# Patient Record
Sex: FEMALE | Race: WHITE | Employment: OTHER | URBAN - METROPOLITAN AREA
[De-identification: names, ages, dates, MRNs, and addresses within clinical notes are randomized per-mention and may not be internally consistent; named-entity substitution may affect disease eponyms.]

---

## 2017-01-11 ENCOUNTER — OFFICE VISIT (OUTPATIENT)
Dept: FAMILY MEDICINE CLINIC | Facility: CLINIC | Age: 42
End: 2017-01-11

## 2017-01-11 VITALS
HEART RATE: 88 BPM | WEIGHT: 173 LBS | RESPIRATION RATE: 12 BRPM | SYSTOLIC BLOOD PRESSURE: 120 MMHG | DIASTOLIC BLOOD PRESSURE: 60 MMHG | BODY MASS INDEX: 29 KG/M2

## 2017-01-11 DIAGNOSIS — S00.83XD CONTUSION OF CHEEK, SUBSEQUENT ENCOUNTER: ICD-10-CM

## 2017-01-11 DIAGNOSIS — S30.0XXD CONTUSION OF BUTTOCK, SUBSEQUENT ENCOUNTER: ICD-10-CM

## 2017-01-11 DIAGNOSIS — S20.212D CONTUSION OF LEFT FRONT WALL OF THORAX, SUBSEQUENT ENCOUNTER: Primary | ICD-10-CM

## 2017-01-11 DIAGNOSIS — S69.91XD INJURY OF RIGHT HAND, SUBSEQUENT ENCOUNTER: ICD-10-CM

## 2017-01-11 DIAGNOSIS — R10.84 ABDOMINAL PAIN, GENERALIZED: ICD-10-CM

## 2017-01-11 PROCEDURE — 99214 OFFICE O/P EST MOD 30 MIN: CPT | Performed by: FAMILY MEDICINE

## 2017-01-11 RX ORDER — ETODOLAC 400 MG/1
400 TABLET, FILM COATED ORAL 2 TIMES DAILY PRN
Qty: 30 TABLET | Refills: 0 | Status: SHIPPED | OUTPATIENT
Start: 2017-01-11 | End: 2017-01-23

## 2017-01-11 RX ORDER — CLONAZEPAM 0.5 MG/1
0.5 TABLET ORAL 2 TIMES DAILY PRN
Qty: 30 TABLET | Refills: 0 | Status: SHIPPED | OUTPATIENT
Start: 2017-01-11 | End: 2017-01-23

## 2017-01-12 NOTE — PROGRESS NOTES
HPI:    Patient ID: Ana Kolb is a 43year old female. HPI  Patient is here for follow-up of multiple contusions over the body and CT scan of the face. No new symptoms. She is feeling better overall. Pain is subsided quite a bit.   Review of motion and sensation in all four extremities.   Neurologic:  alert and oriented to time, space, and person, cranial nerves two through twelve grossly intact, two plus deep tendon reflexes in all four extremities, gait is normal, negative Romberg sign, coord

## 2017-01-23 ENCOUNTER — OFFICE VISIT (OUTPATIENT)
Dept: FAMILY MEDICINE CLINIC | Facility: CLINIC | Age: 42
End: 2017-01-23

## 2017-01-23 ENCOUNTER — MED REC SCAN ONLY (OUTPATIENT)
Dept: FAMILY MEDICINE CLINIC | Facility: CLINIC | Age: 42
End: 2017-01-23

## 2017-01-23 VITALS
HEIGHT: 64.25 IN | BODY MASS INDEX: 29.53 KG/M2 | HEART RATE: 99 BPM | DIASTOLIC BLOOD PRESSURE: 68 MMHG | RESPIRATION RATE: 14 BRPM | WEIGHT: 173 LBS | OXYGEN SATURATION: 99 % | SYSTOLIC BLOOD PRESSURE: 118 MMHG | TEMPERATURE: 98 F

## 2017-01-23 DIAGNOSIS — F41.1 GENERALIZED ANXIETY DISORDER: ICD-10-CM

## 2017-01-23 DIAGNOSIS — S20.212D CONTUSION OF LEFT FRONT WALL OF THORAX, SUBSEQUENT ENCOUNTER: Primary | ICD-10-CM

## 2017-01-23 PROCEDURE — 99214 OFFICE O/P EST MOD 30 MIN: CPT | Performed by: FAMILY MEDICINE

## 2017-01-23 RX ORDER — ETODOLAC 400 MG/1
400 TABLET, FILM COATED ORAL 2 TIMES DAILY PRN
Qty: 30 TABLET | Refills: 0 | Status: SHIPPED | OUTPATIENT
Start: 2017-01-23 | End: 2017-06-07

## 2017-01-23 RX ORDER — CLONAZEPAM 0.5 MG/1
0.5 TABLET ORAL 2 TIMES DAILY PRN
Qty: 30 TABLET | Refills: 0 | Status: SHIPPED | OUTPATIENT
Start: 2017-01-23 | End: 2017-02-27

## 2017-02-21 RX ORDER — CLONAZEPAM 0.5 MG/1
TABLET ORAL
Qty: 30 TABLET | Refills: 0 | OUTPATIENT
Start: 2017-02-21

## 2017-02-21 RX ORDER — ETODOLAC 400 MG/1
TABLET, FILM COATED ORAL
Qty: 30 TABLET | Refills: 0 | OUTPATIENT
Start: 2017-02-21

## 2017-02-22 NOTE — TELEPHONE ENCOUNTER
No future appointments. Please call patient. If she needs any refills, she needs to schedule a follow up with dr. Tomás Ponce   Thanks

## 2017-02-27 ENCOUNTER — OFFICE VISIT (OUTPATIENT)
Dept: FAMILY MEDICINE CLINIC | Facility: CLINIC | Age: 42
End: 2017-02-27

## 2017-02-27 VITALS
HEART RATE: 107 BPM | RESPIRATION RATE: 14 BRPM | BODY MASS INDEX: 30 KG/M2 | WEIGHT: 178 LBS | DIASTOLIC BLOOD PRESSURE: 60 MMHG | OXYGEN SATURATION: 100 % | SYSTOLIC BLOOD PRESSURE: 118 MMHG | TEMPERATURE: 99 F

## 2017-02-27 DIAGNOSIS — M54.50 CHRONIC RIGHT-SIDED LOW BACK PAIN WITHOUT SCIATICA: ICD-10-CM

## 2017-02-27 DIAGNOSIS — F33.1 MODERATE EPISODE OF RECURRENT MAJOR DEPRESSIVE DISORDER (HCC): ICD-10-CM

## 2017-02-27 DIAGNOSIS — J01.00 ACUTE NON-RECURRENT MAXILLARY SINUSITIS: Primary | ICD-10-CM

## 2017-02-27 DIAGNOSIS — G89.29 CHRONIC RIGHT-SIDED LOW BACK PAIN WITHOUT SCIATICA: ICD-10-CM

## 2017-02-27 DIAGNOSIS — F41.1 GENERALIZED ANXIETY DISORDER: ICD-10-CM

## 2017-02-27 PROCEDURE — 99214 OFFICE O/P EST MOD 30 MIN: CPT | Performed by: FAMILY MEDICINE

## 2017-02-27 RX ORDER — CLONAZEPAM 0.5 MG/1
0.5 TABLET ORAL 2 TIMES DAILY PRN
Qty: 30 TABLET | Refills: 0 | Status: SHIPPED | OUTPATIENT
Start: 2017-02-27 | End: 2017-06-07

## 2017-02-27 RX ORDER — AZITHROMYCIN 250 MG/1
TABLET, FILM COATED ORAL
Qty: 6 TABLET | Refills: 0 | Status: SHIPPED | OUTPATIENT
Start: 2017-02-27 | End: 2017-06-07

## 2017-02-27 NOTE — PROGRESS NOTES
HPI:    Patient ID: Nidia Hodges is a 43year old female. HPI  Patient is here with complaint of sinus pain pressure congestion and nasal discharge with yellow mucus for the past several days.   She also requests refill of Klonopin which she does (primary encounter diagnosis)  Generalized anxiety disorder  Moderate episode of recurrent major depressive disorder (hcc)  Chronic right-sided low back pain without sciatica  Z-Carlos, Tylenol fluids rest for sinus infection.   Refill Klonopin but use sparing

## 2017-03-09 ENCOUNTER — TELEPHONE (OUTPATIENT)
Dept: FAMILY MEDICINE CLINIC | Facility: CLINIC | Age: 42
End: 2017-03-09

## 2017-03-14 ENCOUNTER — APPOINTMENT (OUTPATIENT)
Dept: GENERAL RADIOLOGY | Age: 42
End: 2017-03-14
Attending: EMERGENCY MEDICINE
Payer: MEDICAID

## 2017-03-14 ENCOUNTER — APPOINTMENT (OUTPATIENT)
Dept: CT IMAGING | Age: 42
End: 2017-03-14
Attending: EMERGENCY MEDICINE
Payer: MEDICAID

## 2017-03-14 ENCOUNTER — HOSPITAL ENCOUNTER (EMERGENCY)
Age: 42
Discharge: HOME | End: 2017-03-14
Attending: EMERGENCY MEDICINE
Payer: MEDICAID

## 2017-03-14 VITALS
HEIGHT: 64 IN | OXYGEN SATURATION: 98 % | WEIGHT: 160 LBS | SYSTOLIC BLOOD PRESSURE: 135 MMHG | DIASTOLIC BLOOD PRESSURE: 85 MMHG | HEART RATE: 76 BPM | BODY MASS INDEX: 27.31 KG/M2 | TEMPERATURE: 98 F | RESPIRATION RATE: 16 BRPM

## 2017-03-14 DIAGNOSIS — S09.90XA CLOSED HEAD INJURY, INITIAL ENCOUNTER: Primary | ICD-10-CM

## 2017-03-14 DIAGNOSIS — S06.0X0A CONCUSSION, WITHOUT LOSS OF CONSCIOUSNESS, INITIAL ENCOUNTER: ICD-10-CM

## 2017-03-14 DIAGNOSIS — T07.XXXA MULTIPLE CONTUSIONS: ICD-10-CM

## 2017-03-14 LAB
ALBUMIN SERPL-MCNC: 4 G/DL (ref 3.5–4.8)
ALP LIVER SERPL-CCNC: 79 U/L (ref 37–98)
ALT SERPL-CCNC: 85 U/L (ref 14–54)
APTT PPP: 22.2 SECONDS (ref 25–34)
AST SERPL-CCNC: 74 U/L (ref 15–41)
ATRIAL RATE: 85 BPM
BASOPHILS # BLD AUTO: 0.07 X10(3) UL (ref 0–0.1)
BASOPHILS NFR BLD AUTO: 1.1 %
BILIRUB SERPL-MCNC: 0.5 MG/DL (ref 0.1–2)
BUN BLD-MCNC: 10 MG/DL (ref 8–20)
CALCIUM BLD-MCNC: 9.2 MG/DL (ref 8.3–10.3)
CHLORIDE: 103 MMOL/L (ref 101–111)
CO2: 28 MMOL/L (ref 22–32)
CREAT BLD-MCNC: 0.79 MG/DL (ref 0.55–1.02)
EOSINOPHIL # BLD AUTO: 0.13 X10(3) UL (ref 0–0.3)
EOSINOPHIL NFR BLD AUTO: 2 %
ERYTHROCYTE [DISTWIDTH] IN BLOOD BY AUTOMATED COUNT: 12.3 % (ref 11.5–16)
ETHYL ALCOHOL: <3 MG/DL (ref ?–3)
GLUCOSE BLD-MCNC: 90 MG/DL (ref 70–99)
GLUCOSE BLD-MCNC: 95 MG/DL (ref 65–99)
HCT VFR BLD AUTO: 44.4 % (ref 34–50)
HGB BLD-MCNC: 15.1 G/DL (ref 12–16)
IMMATURE GRANULOCYTE COUNT: 0.02 X10(3) UL (ref 0–1)
IMMATURE GRANULOCYTE RATIO %: 0.3 %
INR BLD: 0.93 (ref 0.89–1.12)
LYMPHOCYTES # BLD AUTO: 2.67 X10(3) UL (ref 0.9–4)
LYMPHOCYTES NFR BLD AUTO: 41 %
M PROTEIN MFR SERPL ELPH: 8 G/DL (ref 6.1–8.3)
MCH RBC QN AUTO: 31.6 PG (ref 27–33.2)
MCHC RBC AUTO-ENTMCNC: 34 G/DL (ref 31–37)
MCV RBC AUTO: 92.9 FL (ref 81–100)
MONOCYTES # BLD AUTO: 0.69 X10(3) UL (ref 0.1–0.6)
MONOCYTES NFR BLD AUTO: 10.6 %
NEUTROPHIL ABS PRELIM: 2.94 X10 (3) UL (ref 1.3–6.7)
NEUTROPHILS # BLD AUTO: 2.94 X10(3) UL (ref 1.3–6.7)
NEUTROPHILS NFR BLD AUTO: 45 %
P AXIS: 11 DEGREES
P-R INTERVAL: 122 MS
PLATELET # BLD AUTO: 227 10(3)UL (ref 150–450)
POTASSIUM SERPL-SCNC: 4.1 MMOL/L (ref 3.6–5.1)
PSA SERPL DL<=0.01 NG/ML-MCNC: 12.2 SECONDS (ref 11.8–14.1)
Q-T INTERVAL: 366 MS
QRS DURATION: 76 MS
QTC CALCULATION (BEZET): 435 MS
R AXIS: 4 DEGREES
RBC # BLD AUTO: 4.78 X10(6)UL (ref 3.8–5.1)
RED CELL DISTRIBUTION WIDTH-SD: 42.3 FL (ref 35.1–46.3)
SODIUM SERPL-SCNC: 138 MMOL/L (ref 136–144)
T AXIS: 4 DEGREES
TSI SER-ACNC: 2.32 MIU/ML (ref 0.35–5.5)
VENTRICULAR RATE: 85 BPM
WBC # BLD AUTO: 6.5 X10(3) UL (ref 4–13)

## 2017-03-14 PROCEDURE — 99284 EMERGENCY DEPT VISIT MOD MDM: CPT

## 2017-03-14 PROCEDURE — 80320 DRUG SCREEN QUANTALCOHOLS: CPT | Performed by: EMERGENCY MEDICINE

## 2017-03-14 PROCEDURE — 85730 THROMBOPLASTIN TIME PARTIAL: CPT | Performed by: EMERGENCY MEDICINE

## 2017-03-14 PROCEDURE — 82962 GLUCOSE BLOOD TEST: CPT

## 2017-03-14 PROCEDURE — 84443 ASSAY THYROID STIM HORMONE: CPT | Performed by: EMERGENCY MEDICINE

## 2017-03-14 PROCEDURE — 93010 ELECTROCARDIOGRAM REPORT: CPT

## 2017-03-14 PROCEDURE — 93005 ELECTROCARDIOGRAM TRACING: CPT

## 2017-03-14 PROCEDURE — 96361 HYDRATE IV INFUSION ADD-ON: CPT

## 2017-03-14 PROCEDURE — 76376 3D RENDER W/INTRP POSTPROCES: CPT

## 2017-03-14 PROCEDURE — 71020 XR CHEST PA + LAT CHEST (CPT=71020): CPT

## 2017-03-14 PROCEDURE — 73080 X-RAY EXAM OF ELBOW: CPT

## 2017-03-14 PROCEDURE — 70450 CT HEAD/BRAIN W/O DYE: CPT

## 2017-03-14 PROCEDURE — 80053 COMPREHEN METABOLIC PANEL: CPT | Performed by: EMERGENCY MEDICINE

## 2017-03-14 PROCEDURE — 85610 PROTHROMBIN TIME: CPT | Performed by: EMERGENCY MEDICINE

## 2017-03-14 PROCEDURE — 70486 CT MAXILLOFACIAL W/O DYE: CPT

## 2017-03-14 PROCEDURE — 85025 COMPLETE CBC W/AUTO DIFF WBC: CPT | Performed by: EMERGENCY MEDICINE

## 2017-03-14 PROCEDURE — 72125 CT NECK SPINE W/O DYE: CPT

## 2017-03-14 PROCEDURE — 99285 EMERGENCY DEPT VISIT HI MDM: CPT

## 2017-03-14 PROCEDURE — 96374 THER/PROPH/DIAG INJ IV PUSH: CPT

## 2017-03-14 RX ORDER — KETOROLAC TROMETHAMINE 30 MG/ML
15 INJECTION, SOLUTION INTRAMUSCULAR; INTRAVENOUS ONCE
Status: COMPLETED | OUTPATIENT
Start: 2017-03-14 | End: 2017-03-14

## 2017-03-14 RX ORDER — NAPROXEN 500 MG/1
500 TABLET ORAL 2 TIMES DAILY PRN
Qty: 20 TABLET | Refills: 0 | Status: SHIPPED | OUTPATIENT
Start: 2017-03-14 | End: 2017-03-21
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
## 2017-03-15 ENCOUNTER — TELEPHONE (OUTPATIENT)
Dept: FAMILY MEDICINE CLINIC | Facility: CLINIC | Age: 42
End: 2017-03-15

## 2017-03-15 NOTE — TELEPHONE ENCOUNTER
LMTCB X 1  Patient was seen at [address] [place] [address] on 03/14/2017 and discharged on same day.

## 2017-03-15 NOTE — TELEPHONE ENCOUNTER
Pt called back.  appt scheduled  Future Appointments  Date Time Provider Stefanie Hyde   3/18/2017 11:45 AM Meli Velasco MD [facility] [address] [facility] [address]

## 2017-03-18 ENCOUNTER — TELEPHONE (OUTPATIENT)
Dept: FAMILY MEDICINE CLINIC | Facility: CLINIC | Age: 42
End: 2017-03-18

## 2017-04-11 RX ORDER — ESCITALOPRAM OXALATE 20 MG/1
TABLET ORAL
Qty: 30 TABLET | Refills: 0 | Status: SHIPPED | OUTPATIENT
Start: 2017-04-11 | End: 2017-05-05

## 2017-05-06 RX ORDER — ESCITALOPRAM OXALATE 20 MG/1
TABLET ORAL
Qty: 30 TABLET | Refills: 0 | Status: SHIPPED | OUTPATIENT
Start: 2017-05-06 | End: 2017-05-31

## 2017-06-01 RX ORDER — ESCITALOPRAM OXALATE 20 MG/1
TABLET ORAL
Qty: 30 TABLET | Refills: 0 | Status: SHIPPED | OUTPATIENT
Start: 2017-06-01 | End: 2017-07-17

## 2017-06-02 NOTE — TELEPHONE ENCOUNTER
Future Appointments  Date Time Provider Stefanie Hyde   6/6/2017 3:30 PM Lucero Newman MD [facility] [address] [facility] [address]

## 2017-06-02 NOTE — TELEPHONE ENCOUNTER
No future appointments. Please call pt. Only 30 day supply of medication sent in.  She needs to be seen for further refills before she runs out of meds

## 2017-06-07 ENCOUNTER — APPOINTMENT (OUTPATIENT)
Dept: LAB | Age: 42
End: 2017-06-07
Attending: FAMILY MEDICINE
Payer: MEDICAID

## 2017-06-07 ENCOUNTER — OFFICE VISIT (OUTPATIENT)
Dept: FAMILY MEDICINE CLINIC | Facility: CLINIC | Age: 42
End: 2017-06-07

## 2017-06-07 VITALS
OXYGEN SATURATION: 99 % | DIASTOLIC BLOOD PRESSURE: 60 MMHG | TEMPERATURE: 99 F | WEIGHT: 185.25 LBS | SYSTOLIC BLOOD PRESSURE: 120 MMHG | BODY MASS INDEX: 32 KG/M2 | HEART RATE: 97 BPM | RESPIRATION RATE: 12 BRPM

## 2017-06-07 DIAGNOSIS — F33.1 MODERATE EPISODE OF RECURRENT MAJOR DEPRESSIVE DISORDER (HCC): ICD-10-CM

## 2017-06-07 DIAGNOSIS — M79.7 FIBROMYALGIA: ICD-10-CM

## 2017-06-07 DIAGNOSIS — Z00.00 ROUTINE ADULT HEALTH MAINTENANCE: ICD-10-CM

## 2017-06-07 DIAGNOSIS — Z00.00 ROUTINE ADULT HEALTH MAINTENANCE: Primary | ICD-10-CM

## 2017-06-07 DIAGNOSIS — F41.1 GENERALIZED ANXIETY DISORDER: ICD-10-CM

## 2017-06-07 PROCEDURE — 80061 LIPID PANEL: CPT | Performed by: FAMILY MEDICINE

## 2017-06-07 PROCEDURE — 84439 ASSAY OF FREE THYROXINE: CPT | Performed by: FAMILY MEDICINE

## 2017-06-07 PROCEDURE — 99213 OFFICE O/P EST LOW 20 MIN: CPT | Performed by: FAMILY MEDICINE

## 2017-06-07 PROCEDURE — 80050 GENERAL HEALTH PANEL: CPT | Performed by: FAMILY MEDICINE

## 2017-06-07 PROCEDURE — 99396 PREV VISIT EST AGE 40-64: CPT | Performed by: FAMILY MEDICINE

## 2017-06-07 PROCEDURE — 36415 COLL VENOUS BLD VENIPUNCTURE: CPT | Performed by: FAMILY MEDICINE

## 2017-06-07 RX ORDER — FLUOXETINE HYDROCHLORIDE 20 MG/1
20 CAPSULE ORAL DAILY
Qty: 30 CAPSULE | Refills: 0 | Status: SHIPPED | OUTPATIENT
Start: 2017-06-07 | End: 2017-07-01

## 2017-06-09 NOTE — PROGRESS NOTES
HPI:   Britany Germain is a 43year old female who presents for a complete physical exam. Symptoms: denies discharge, itching, burning or dysuria. Patient has no complaints today.     Routine adult health maintenance  (primary encounter diagnosis)  Fibr DRAW BLUE   Result Value Ref [place]   Hold Blue Auto Resulted    -RAINBOW DRAW GOLD   Result Value Ref [place]   Hold Gold Auto Resulted    -RAINBOW DRAW LAVENDER   Result Value Ref [place]   Hold Lavender Auto Resulted    -RAINBOW DRAW LIGHT GREEN   Result Carole rashes  EYES:denies vision changes  HEENT: denies upper respiratory symptoms  LUNGS: denies cough or shortness of breath with exertion  CHEST:  denies breast changes or pain  CARDIOVASCULAR: denies chest pain or tightness on exertion: no edema  VASCULAR: d and Vit D supplementation and on osteoporosis and bone density testing. Aerobic exercise 30 minutes five days a week for cardiovascular fitness and 45-60 minutes 6-7 days a week for weight loss. Advised testicular self exam once a month.     Above age 1

## 2017-06-19 ENCOUNTER — OFFICE VISIT (OUTPATIENT)
Dept: FAMILY MEDICINE CLINIC | Facility: CLINIC | Age: 42
End: 2017-06-19

## 2017-06-19 VITALS
TEMPERATURE: 99 F | BODY MASS INDEX: 32 KG/M2 | DIASTOLIC BLOOD PRESSURE: 62 MMHG | WEIGHT: 186 LBS | OXYGEN SATURATION: 100 % | SYSTOLIC BLOOD PRESSURE: 118 MMHG | HEART RATE: 94 BPM | RESPIRATION RATE: 14 BRPM

## 2017-06-19 DIAGNOSIS — F41.1 GENERALIZED ANXIETY DISORDER: ICD-10-CM

## 2017-06-19 DIAGNOSIS — E66.09 NON MORBID OBESITY DUE TO EXCESS CALORIES: ICD-10-CM

## 2017-06-19 DIAGNOSIS — R53.83 FATIGUE, UNSPECIFIED TYPE: Primary | ICD-10-CM

## 2017-06-19 DIAGNOSIS — E03.9 HYPOTHYROIDISM, UNSPECIFIED TYPE: ICD-10-CM

## 2017-06-19 DIAGNOSIS — F33.1 MODERATE EPISODE OF RECURRENT MAJOR DEPRESSIVE DISORDER (HCC): ICD-10-CM

## 2017-06-19 PROCEDURE — 99214 OFFICE O/P EST MOD 30 MIN: CPT | Performed by: FAMILY MEDICINE

## 2017-06-19 RX ORDER — LEVOTHYROXINE SODIUM 0.03 MG/1
25 TABLET ORAL
Qty: 30 TABLET | Refills: 1 | Status: SHIPPED | OUTPATIENT
Start: 2017-06-19 | End: 2017-07-17

## 2017-06-20 NOTE — PROGRESS NOTES
HPI:    Patient ID: Javy Schwarz is a 43year old female. HPI  F/u of labs, fatigue, states prozac is working very well for controlling her irritability and depressed mood. Review of Systems  Except for the above, all other ROS are negative. hypothyroidism. Start [id number] qd. Recheck thyroid labs in 4 wks, f/u after. Counseled regarding all of the above conditions. Cont. Prozac. 20mg qd.     Orders Placed This Encounter  TSH and Free T4 [E]  Triiodothyronine,Free,Serum [E]    Meds

## 2017-07-03 RX ORDER — FLUOXETINE HYDROCHLORIDE 20 MG/1
CAPSULE ORAL
Qty: 30 CAPSULE | Refills: 0 | Status: SHIPPED | OUTPATIENT
Start: 2017-07-03 | End: 2017-07-17

## 2017-07-03 RX ORDER — ESCITALOPRAM OXALATE 20 MG/1
TABLET ORAL
Qty: 30 TABLET | Refills: 0 | OUTPATIENT
Start: 2017-07-03

## 2017-07-14 ENCOUNTER — APPOINTMENT (OUTPATIENT)
Dept: LAB | Age: 42
End: 2017-07-14
Attending: FAMILY MEDICINE
Payer: COMMERCIAL

## 2017-07-14 DIAGNOSIS — E03.9 HYPOTHYROIDISM, UNSPECIFIED TYPE: ICD-10-CM

## 2017-07-14 LAB
FREE T4: 1.1 NG/DL (ref 0.9–1.8)
T3FREE SERPL-MCNC: 3.25 PG/ML (ref 2.3–4.2)
TSI SER-ACNC: 0.7 MIU/ML (ref 0.35–5.5)

## 2017-07-14 PROCEDURE — 84443 ASSAY THYROID STIM HORMONE: CPT | Performed by: FAMILY MEDICINE

## 2017-07-14 PROCEDURE — 84481 FREE ASSAY (FT-3): CPT | Performed by: FAMILY MEDICINE

## 2017-07-14 PROCEDURE — 36415 COLL VENOUS BLD VENIPUNCTURE: CPT | Performed by: FAMILY MEDICINE

## 2017-07-14 PROCEDURE — 84439 ASSAY OF FREE THYROXINE: CPT | Performed by: FAMILY MEDICINE

## 2017-07-17 NOTE — PROGRESS NOTES
HPI:    Patient ID: Josué Eduardo is a 43year old female. HPI  Patient is here for follow-up of labs. She still has some fatigue. She is doing well with her depression and it is well controlled with Prozac 20 mg daily. No suicidal thoughts.   R labs in 2 months and follow-up after that. Patient was counseled at length regarding depression and/or anxiety. Patient told to use any medications give as directed and risks and benefits of medications discussed in detail.  Patient told to call 911 if any

## 2017-08-01 RX ORDER — HYDROCHLOROTHIAZIDE 25 MG/1
TABLET ORAL
Qty: 30 TABLET | Refills: 0 | OUTPATIENT
Start: 2017-08-01

## 2017-08-01 RX ORDER — HYDROCHLOROTHIAZIDE 25 MG/1
TABLET ORAL
Qty: 30 TABLET | Refills: 0 | Status: SHIPPED | OUTPATIENT
Start: 2017-08-01

## 2017-08-09 DIAGNOSIS — E03.9 HYPOTHYROIDISM, UNSPECIFIED TYPE: ICD-10-CM

## 2017-08-09 RX ORDER — LEVOTHYROXINE SODIUM 0.03 MG/1
25 TABLET ORAL
Qty: 30 TABLET | Refills: 1 | Status: SHIPPED | OUTPATIENT
Start: 2017-08-09 | End: 2017-09-06

## 2017-08-21 RX ORDER — CLONAZEPAM 0.5 MG/1
0.5 TABLET ORAL 2 TIMES DAILY PRN
Qty: 30 TABLET | Refills: 0 | Status: SHIPPED | OUTPATIENT
Start: 2017-08-21 | End: 2017-09-15

## 2017-08-25 ENCOUNTER — OFFICE VISIT (OUTPATIENT)
Dept: FAMILY MEDICINE CLINIC | Facility: CLINIC | Age: 42
End: 2017-08-25

## 2017-08-25 DIAGNOSIS — Z23 NEED FOR TDAP VACCINATION: Primary | ICD-10-CM

## 2017-08-25 PROCEDURE — 90715 TDAP VACCINE 7 YRS/> IM: CPT | Performed by: PHYSICIAN ASSISTANT

## 2017-08-25 PROCEDURE — 90471 IMMUNIZATION ADMIN: CPT | Performed by: PHYSICIAN ASSISTANT

## 2017-09-06 ENCOUNTER — APPOINTMENT (OUTPATIENT)
Dept: LAB | Age: 42
End: 2017-09-06
Attending: FAMILY MEDICINE
Payer: COMMERCIAL

## 2017-09-06 ENCOUNTER — TELEPHONE (OUTPATIENT)
Dept: FAMILY MEDICINE CLINIC | Facility: CLINIC | Age: 42
End: 2017-09-06

## 2017-09-06 DIAGNOSIS — E03.9 HYPOTHYROIDISM, UNSPECIFIED TYPE: ICD-10-CM

## 2017-09-06 LAB
FREE T4: 0.9 NG/DL (ref 0.9–1.8)
T3FREE SERPL-MCNC: 2.42 PG/ML (ref 2.3–4.2)
TSI SER-ACNC: 0.66 MIU/ML (ref 0.35–5.5)

## 2017-09-06 PROCEDURE — 84481 FREE ASSAY (FT-3): CPT | Performed by: FAMILY MEDICINE

## 2017-09-06 PROCEDURE — 84439 ASSAY OF FREE THYROXINE: CPT | Performed by: FAMILY MEDICINE

## 2017-09-06 PROCEDURE — 84443 ASSAY THYROID STIM HORMONE: CPT | Performed by: FAMILY MEDICINE

## 2017-09-06 PROCEDURE — 36415 COLL VENOUS BLD VENIPUNCTURE: CPT | Performed by: FAMILY MEDICINE

## 2017-09-06 RX ORDER — LEVOTHYROXINE SODIUM 0.03 MG/1
TABLET ORAL
Qty: 30 TABLET | Refills: 0 | Status: SHIPPED | OUTPATIENT
Start: 2017-09-06 | End: 2017-09-06

## 2017-09-06 RX ORDER — LEVOTHYROXINE SODIUM 0.03 MG/1
TABLET ORAL
Qty: 7 TABLET | Refills: 0 | Status: SHIPPED | OUTPATIENT
Start: 2017-09-06 | End: 2017-09-12

## 2017-09-06 RX ORDER — HYDROCHLOROTHIAZIDE 25 MG/1
TABLET ORAL
Qty: 30 TABLET | Refills: 0 | OUTPATIENT
Start: 2017-09-06

## 2017-09-06 RX ORDER — HYDROCHLOROTHIAZIDE 25 MG/1
TABLET ORAL
Qty: 30 TABLET | Refills: 0 | Status: SHIPPED | OUTPATIENT
Start: 2017-09-06

## 2017-09-06 NOTE — TELEPHONE ENCOUNTER
Patient needs 5-7 days of her thyroid medication sent to local pharmacy. Patient took last pill today and is having labs done today as well. Pt has an appt scheduled for 9-11-17 with Dr. Quyen Spencer for further refills, so only 5-7 days is necessary.

## 2017-09-12 ENCOUNTER — OFFICE VISIT (OUTPATIENT)
Dept: FAMILY MEDICINE CLINIC | Facility: CLINIC | Age: 42
End: 2017-09-12

## 2017-09-12 VITALS
BODY MASS INDEX: 32.55 KG/M2 | WEIGHT: 190.63 LBS | SYSTOLIC BLOOD PRESSURE: 120 MMHG | HEIGHT: 64.25 IN | DIASTOLIC BLOOD PRESSURE: 74 MMHG | RESPIRATION RATE: 16 BRPM | HEART RATE: 80 BPM | TEMPERATURE: 98 F

## 2017-09-12 DIAGNOSIS — R53.83 FATIGUE, UNSPECIFIED TYPE: ICD-10-CM

## 2017-09-12 DIAGNOSIS — E03.9 HYPOTHYROIDISM, UNSPECIFIED TYPE: ICD-10-CM

## 2017-09-12 DIAGNOSIS — M54.5 LOW BACK PAIN, UNSPECIFIED BACK PAIN LATERALITY, UNSPECIFIED CHRONICITY, WITH SCIATICA PRESENCE UNSPECIFIED: Primary | ICD-10-CM

## 2017-09-12 PROCEDURE — 99214 OFFICE O/P EST MOD 30 MIN: CPT | Performed by: FAMILY MEDICINE

## 2017-09-12 RX ORDER — ETODOLAC 400 MG/1
400 TABLET, FILM COATED ORAL 2 TIMES DAILY
Qty: 30 TABLET | Refills: 0 | Status: SHIPPED | OUTPATIENT
Start: 2017-09-12

## 2017-09-12 RX ORDER — LEVOTHYROXINE SODIUM 0.05 MG/1
50 TABLET ORAL
Qty: 30 TABLET | Refills: 1 | Status: SHIPPED | OUTPATIENT
Start: 2017-09-12 | End: 2017-11-06

## 2017-09-12 NOTE — PROGRESS NOTES
HPI:    Patient ID: Chiara Borges is a 43year old female. HPI  Patient is here for follow-up of fatigue, hypothyroidism, complaint of chronic low back pain.   She states she had x-ray of her lumbar spine a year or 2 ago which showed some arthritis quadrants, abdomen is soft, non-tender, non-distended, no hepatosplenomegaly, no abnormal aortic pulsation.    MUSCULOSKELETAL: Mild paralumbar tenderness but no lumbar spinal tenderness, negative straight leg raise bilaterally, 2+ deep tendon reflexes both

## 2017-09-13 ENCOUNTER — HOSPITAL ENCOUNTER (OUTPATIENT)
Dept: GENERAL RADIOLOGY | Age: 42
Discharge: HOME | End: 2017-09-13
Attending: FAMILY MEDICINE
Payer: COMMERCIAL

## 2017-09-13 DIAGNOSIS — M54.5 LOW BACK PAIN, UNSPECIFIED BACK PAIN LATERALITY, UNSPECIFIED CHRONICITY, WITH SCIATICA PRESENCE UNSPECIFIED: ICD-10-CM

## 2017-09-13 PROCEDURE — 72100 X-RAY EXAM L-S SPINE 2/3 VWS: CPT | Performed by: FAMILY MEDICINE

## 2017-09-15 ENCOUNTER — OFFICE VISIT (OUTPATIENT)
Dept: FAMILY MEDICINE CLINIC | Facility: CLINIC | Age: 42
End: 2017-09-15

## 2017-09-15 VITALS
DIASTOLIC BLOOD PRESSURE: 70 MMHG | BODY MASS INDEX: 32.1 KG/M2 | HEART RATE: 72 BPM | TEMPERATURE: 98 F | RESPIRATION RATE: 16 BRPM | WEIGHT: 188 LBS | SYSTOLIC BLOOD PRESSURE: 120 MMHG | HEIGHT: 64.25 IN

## 2017-09-15 DIAGNOSIS — G89.29 CHRONIC LOW BACK PAIN, UNSPECIFIED BACK PAIN LATERALITY, WITH SCIATICA PRESENCE UNSPECIFIED: Primary | ICD-10-CM

## 2017-09-15 DIAGNOSIS — M54.5 CHRONIC LOW BACK PAIN, UNSPECIFIED BACK PAIN LATERALITY, WITH SCIATICA PRESENCE UNSPECIFIED: Primary | ICD-10-CM

## 2017-09-15 PROCEDURE — 99213 OFFICE O/P EST LOW 20 MIN: CPT | Performed by: FAMILY MEDICINE

## 2017-09-15 RX ORDER — CLONAZEPAM 0.5 MG/1
0.5 TABLET ORAL 2 TIMES DAILY PRN
Qty: 30 TABLET | Refills: 0 | Status: SHIPPED | OUTPATIENT
Start: 2017-09-15 | End: 2017-10-06

## 2017-09-15 RX ORDER — HEPATITIS B VACCINE (RECOMBINANT) 20 UG/ML
INJECTION, SUSPENSION INTRAMUSCULAR
COMMUNITY
Start: 2017-08-21

## 2017-09-18 PROBLEM — G89.29 CHRONIC LOW BACK PAIN: Status: ACTIVE | Noted: 2017-09-18

## 2017-09-18 PROBLEM — M54.50 CHRONIC LOW BACK PAIN: Status: ACTIVE | Noted: 2017-09-18

## 2017-09-18 NOTE — PROGRESS NOTES
HPI:    Patient ID: Issa Doherty is a 43year old female. HPI  Patient is here for follow-up of chronic low back pain. She did have x-rays done.   Review of Systems  Negative except for the above       Current Outpatient Prescriptions:  ClonazePA deep tendon reflexes both lower extremities, gait is normal.            ASSESSMENT/PLAN:   Chronic low back pain, unspecified back pain laterality, with sciatica presence unspecified  (primary encounter diagnosis)  I would recommend continuing anti-inflamm

## 2017-10-06 ENCOUNTER — TELEPHONE (OUTPATIENT)
Dept: FAMILY MEDICINE CLINIC | Facility: CLINIC | Age: 42
End: 2017-10-06

## 2017-10-06 RX ORDER — CLONAZEPAM 0.5 MG/1
0.5 TABLET ORAL 2 TIMES DAILY PRN
Qty: 30 TABLET | Refills: 0 | Status: SHIPPED | OUTPATIENT
Start: 2017-10-06 | End: 2017-10-27

## 2017-10-06 NOTE — TELEPHONE ENCOUNTER
Pt states her father passed away and would like her ClonazePAM (KLONOPIN) 0.5 MG Oral Tab refilled and she wants a pain medication.  No info on why she is requesting a pain medication

## 2017-10-09 NOTE — TELEPHONE ENCOUNTER
Left detailed message on pt's vm letting her know for any pain meds she needs an appt. She was asked to Enmanuel rBadley if she has any further questions or if she wants to schedule an appt.

## 2017-10-10 RX ORDER — HYDROCHLOROTHIAZIDE 25 MG/1
TABLET ORAL
Qty: 30 TABLET | Refills: 0 | Status: SHIPPED | OUTPATIENT
Start: 2017-10-10

## 2017-10-10 NOTE — TELEPHONE ENCOUNTER
HYDROCHLOROTHIAZIDE 25MG TABLETS  Will file in chart as: HYDROCHLOROTHIAZIDE 25 MG Oral Tab  The source prescription was discontinued on 8/1/2017 by Alexx Hernandez [address] [place] [address] Ave.   TAKE 1 TABLET BY MOUTH EVERY MORNING       Disp: 30 tablet Refills: 0    Class: Norm

## 2017-10-12 RX ORDER — FLUOXETINE HYDROCHLORIDE 20 MG/1
20 CAPSULE ORAL
Qty: 30 CAPSULE | Refills: 2 | Status: SHIPPED | OUTPATIENT
Start: 2017-10-12

## 2017-10-12 NOTE — TELEPHONE ENCOUNTER
Requesting: Prozac 20mg  LOV: 9/15/17  RTC: -  Last Labs: 9/6/17  Filled: 7/17/17 #30 with 2 refills    No future appointments. -medication pended for review, please advise.

## 2017-10-27 RX ORDER — CLONAZEPAM 0.5 MG/1
0.5 TABLET ORAL 2 TIMES DAILY PRN
Qty: 30 TABLET | Refills: 0 | Status: SHIPPED | OUTPATIENT
Start: 2017-10-27

## 2017-11-05 DIAGNOSIS — E03.9 HYPOTHYROIDISM, UNSPECIFIED TYPE: ICD-10-CM

## 2017-11-06 RX ORDER — HYDROCHLOROTHIAZIDE 25 MG/1
TABLET ORAL
Qty: 30 TABLET | Refills: 1 | Status: SHIPPED | OUTPATIENT
Start: 2017-11-06

## 2017-11-06 RX ORDER — LEVOTHYROXINE SODIUM 0.05 MG/1
50 TABLET ORAL
Qty: 30 TABLET | Refills: 1 | Status: SHIPPED | OUTPATIENT
Start: 2017-11-06

## 2017-11-06 NOTE — TELEPHONE ENCOUNTER
Requesting: HCTZ 25mg, Levothyroxine 50mcg  LOV: 9/16/17  RTC: -  Last Relevant Labs: CMP - 6/7/17, TSH - 9/6/17  Filled: 10/10/17 #30 with 0 refills - HCTZ  Filled: 09/12/17 #30 with 1 refills - Levothyroxine    No future appointments.       Hypertension M

## 2017-11-14 RX ORDER — CLONAZEPAM 0.5 MG/1
0.5 TABLET ORAL 2 TIMES DAILY PRN
Qty: 30 TABLET | Refills: 0 | OUTPATIENT
Start: 2017-11-14

## 2017-11-14 NOTE — TELEPHONE ENCOUNTER
Received refill request from [facility] in [place] for Clonazepam 0.5mg tablets for quantity of 30.

## 2017-11-30 ENCOUNTER — TELEPHONE (OUTPATIENT)
Dept: FAMILY MEDICINE CLINIC | Facility: CLINIC | Age: 42
End: 2017-11-30

## 2017-11-30 NOTE — TELEPHONE ENCOUNTER
Received request and authorization for release of all records. Patient has [facility] and will be switching providers. Please send records to Sujata Rubio M.D., [address] [facility]. Farzana Cari, [address] [place] [address], [address], fax [phone].  Faxed request to [employer]

## 2017-12-29 RX ORDER — ESCITALOPRAM OXALATE 20 MG/1
TABLET ORAL
Qty: 30 TABLET | Refills: 0 | OUTPATIENT
Start: 2017-12-29

## 2018-01-24 RX ORDER — ETODOLAC 400 MG/1
TABLET, FILM COATED ORAL
Qty: 30 TABLET | Refills: 0 | OUTPATIENT
Start: 2018-01-24

## 2018-01-24 RX ORDER — FLUOXETINE HYDROCHLORIDE 20 MG/1
CAPSULE ORAL
Qty: 30 CAPSULE | Refills: 0 | OUTPATIENT
Start: 2018-01-24

## 2018-01-24 RX ORDER — ALPRAZOLAM 0.5 MG/1
TABLET ORAL
Qty: 40 TABLET | Refills: 0 | OUTPATIENT
Start: 2018-01-24

## 2018-08-27 RX ORDER — HYDROCHLOROTHIAZIDE 25 MG/1
TABLET ORAL
Qty: 30 TABLET | Refills: 0 | OUTPATIENT
Start: 2018-08-27

## 2018-08-27 NOTE — TELEPHONE ENCOUNTER
Requesting Hctz  LOV: 9/15/17  RTC: not noted  Last Relevant Labs: 6/7/17  Filled: 11/6/17 #30 with 1 refills    No future appointments.       Patient has [facility] - denied as we do not accept

## 2023-02-28 NOTE — TELEPHONE ENCOUNTER
Pt called and is requesting a 30 day supply of clonazepam. She has been having some stress lately due to kids going back to school.   Medication pended and routed to PCP friend/significant other

## 2023-06-02 NOTE — MR AVS SNAPSHOT
[facility] [address] [place] [address] [address] [facility]  Shira Levy [phone] [phone]               Thank you for choosing us for your health care visit with Daniel Colon MD.  We are glad to serve you and happy to provide you wit Component Value Standard Range & Units    WBC 7.0 4.0-13.0 x10(3) uL    RBC 4.14 3.80-5.10 x10(6)uL    HGB 13.1 12.0-16.0 g/dL    HCT 40.5 34.0-50.0 %    .0 150.0-450.0 10(3)uL    MCV 97.8 81.0-100.0 fL    MCH 31.6 27.0-33.2 pg    MCHC 32.3 31.0-3 4. 97           4.44             Average      9.55           7.05             Twice average      23.99         11.04             Three times average            Non HDL Chol 124 <130 mg/dL      Reference ranges for non-HDL-C are based on [employer] [facility] online at  [email or web address] [Mild Persistent] : mild persistent [Doing Well] : doing well [Well Controlled] : Well controlled [Adherent] : the patient is adherent with ~his/her~ medication regimen [Goals--Doing Well] : the patient is doing well with ~his/her~ asthma goals [PFTs] : pulmonary function tests [Excess Weight] : excess weight [Currently Experiencing] : The patient is currently experiencing symptoms. [Dyspnea] : dyspnea [Low Calorie Diet] : low calorie diet [Fair Compliance] : fair compliance with treatment [Fair Tolerance] : fair tolerance of treatment [Poor Symptom Control] : poor symptom control [Hypertension] : hypertension [High] : high [Low Calorie] : low calorie [Well Balanced Diet] : well balanced meals [Follow-Up - Routine Clinic] : a routine clinic follow-up of [Excessive Daytime Sleepiness] : excessive daytime sleepiness [Unrefreshing Sleep] : unrefreshing sleep [Sleepy When Sedentary] : sleepy when sedentary [None] : The patient is not currently being treated for this problem [TextBox_4] : The patient presents for evaluation for her upcoming catheterization.\par She c/o increased SOBOE despite no change in her lung function and had cardiac w/u which was not diagnostic.\par Pt was + for Covid on 10/9/21 and had the Monoclonal Ab infusion when she felt URI symptoms, fevers, H/a, body aches, cough but now with resolution of symptoms.\par Pt had HST for poor sleep and difficult to control HTN, especially in AM. Pt with mild JOSE but does not want to proceed with therapy. The patient understands the risks of untreated JOSE including heart disease, strokes, hypertension, pulmonary hypertension, and motor vehicle accidents as well as the need for treatment and weight loss. \par Pt reports one week h/o wheeze and cough.

## (undated) NOTE — MR AVS SNAPSHOT
[facility] [address] [place] [address] 700 [facility]  Shira Levy [phone] [phone]               Thank you for choosing us for your health care visit with Starla Grigsby MD.  We are glad to serve you and happy to provide you wit TAKE 1 TABLET BY MOUTH EVERY MORNING   Commonly known as:  HYDRODIURIL                Where to Get Your Medications      These medications were sent to JOHN/ Aiden Past[address], [address] AT [facility], [address]

## (undated) NOTE — Clinical Note
Date: 2/27/2017    Patient Name: Kim Francois          To Whom it may concern: The above patient was seen at the [facility] for treatment of a medical condition.     This patient should be excused from attending work on Monday, Febr

## (undated) NOTE — MR AVS SNAPSHOT
[facility] [address] [place] [address] 700 [facility]  Shira Levy [phone] [phone]               Thank you for choosing us for your health care visit with Oscar Magallon MD.  We are glad to serve you and happy to provide you wit HYDROcodone-acetaminophen  MG Tabs   Take 1 tablet by mouth every 6 (six) hours as needed for Pain.    Commonly known as:  Anisa Ghosh                Where to Get Your Medications      These medications were sent to [address],

## (undated) NOTE — MR AVS SNAPSHOT
[facility] [address] [place] [address] 700 [facility]  Shira Levy [phone] [phone]               Thank you for choosing us for your health care visit with Ranjan Oconnor MD.  We are glad to serve you and happy to provide you wit HYDROcodone-acetaminophen  MG Tabs   Take 1 tablet by mouth every 6 (six) hours as needed for Pain.    Commonly known as:  [address] [place] [address]                Where to Get Your Medications      These medications were sent to [address],

## (undated) NOTE — MR AVS SNAPSHOT
[facility] [address] [place] [address] 700 [facility]  Shira Levy [phone] [phone]               Thank you for choosing us for your health care visit with Cindy Disla MD.  We are glad to serve you and happy to provide you wit Levothyroxine Sodium 25 MCG Tabs   Take 1 tablet (25 mcg total) by mouth before breakfast.   Commonly known as:  SYNTHROID, LEVOTHROID                Where to Get Your Medications      These medications were sent to [address]

## (undated) NOTE — LETTER
11/11/17        [facility] [address]   [address]  [facility] [address]      Dear Casandra Weiss,    [address] [facility] records indicate that you have outstanding lab work and or testing that was ordered for you and has not yet been completed:          TSH and Free T4 [E]

## (undated) NOTE — ED AVS SNAPSHOT
THE [facility] Emergency Department in [address] [facility]    Phone:  [phone]    Fax:  [phone]           Fernanda Warren   MRN: [id number]    Department:  THE [facility] Emergency Department in [place]   Date of Vi IF THERE IS ANY CHANGE OR WORSENING OF YOUR CONDITION, CALL YOUR PRIMARY CARE PHYSICIAN AT ONCE OR RETURN IMMEDIATELY TO THE EMERGENCY DEPARTMENT.     If you have been prescribed any medication(s), please fill your prescription right away and begin taking t

## (undated) NOTE — ED AVS SNAPSHOT
THE [facility] Emergency Department in [address] [facility]    Phone:  [phone]    Fax:  [phone]           Nidia Hodges   MRN: [id number]    Department:  THE [facility] Emergency Department in [place]   Date of Vi Return for new or worsening symptoms such as increasing headache, confusion, weakness, numbness, vomiting.     Discharge References/Attachments     CONCUSSION (ENGLISH)      Disclosure     Insurance plans vary and the physician(s) referred by the ER may not CARE PHYSICIAN AT ONCE OR RETURN IMMEDIATELY TO THE EMERGENCY DEPARTMENT.     If you have been prescribed any medication(s), please fill your prescription right away and begin taking the medication(s) as directed    If the emergency physician has read X-ray coverage. Patient [address] is a Federal Navigator program that can help with your Affordable Care Act coverage, as well as all types of Medicaid plans.   To get signed up and covered, please call [phone] and ask to get set up for an insuran fracture or malalignment is evident on the lateral projection. CT SPINE CERVICAL (CPT=72125) (Final result) Result time:  03/14/17 11:33:41    Final result    Impression:    CONCLUSION:    1. No evidence of acute fracture.   2. Multilevel dege canal stenosis. CT FACIAL BONES (CPT=70486) (Final result) Result time:  03/14/17 11:56:47    Final result    Impression:    CONCLUSION:    #1. No facial bone fracture.  Unchanged compared to study of 12/30/16.  2. Sinus disease slightly frontal scalp hematoma. There is mild right and mild to moderate left degenerative change of the mandibular condyles and slight narrowing of the TMJ's bilaterally. The  ORBITS:  No visible mass, hematoma, edema, or fracture.     SALIVARY GLANDS:  The paroti right posterior ethmoidal air cells. MASTOIDS:          No sign of acute inflammation. SKULL:             No evidence for fracture or osseous abnormality. OTHER:             Small frontal scalp hematoma.              MyChart